# Patient Record
Sex: FEMALE | Race: BLACK OR AFRICAN AMERICAN | NOT HISPANIC OR LATINO | Employment: UNEMPLOYED | URBAN - NONMETROPOLITAN AREA
[De-identification: names, ages, dates, MRNs, and addresses within clinical notes are randomized per-mention and may not be internally consistent; named-entity substitution may affect disease eponyms.]

---

## 2021-01-27 ENCOUNTER — CONVERSION ENCOUNTER (OUTPATIENT)
Dept: FAMILY MEDICINE CLINIC | Facility: CLINIC | Age: 26
End: 2021-01-27

## 2021-01-27 ENCOUNTER — OFFICE VISIT CONVERTED (OUTPATIENT)
Dept: FAMILY MEDICINE CLINIC | Facility: CLINIC | Age: 26
End: 2021-01-27
Attending: NURSE PRACTITIONER

## 2021-05-10 NOTE — H&P
"   History and Physical      Patient Name: Timothy Gayle   Patient ID: [id number]   Sex: Female   YOB: 1995    Primary Care Provider: Alia CHEN   Referring Provider: Alia CHEN    Visit Date: January 27, 2021    Provider: APRIL Ibarra   Location: [facility]   Location Address: [address] [place] [address]  [id number]   Location Phone: [phone]          Chief Complaint  · Establish care, needs a physical.      History Of Present Illness  Timothy Gayle is a 25 year old /Black female who presents for evaluation and treatment of:      New patient/establish care:    Lives in [place], previously from [place], no previous provider. No current meds.    Current CC patient residing at [facility]. States she is staying in treatment to avoid [facility] time, did not elaborate further.     Pt with no acute issues or complaints.            Allergy List  Allergen Name Date Reaction Notes   NO KNOWN DRUG ALLERGIES --  --  --          Social History  Finding Status Start/Stop Quantity Notes   Tobacco Never --/-- --  Non user of tobacco products.         Review of Systems  · Constitutional  o Denies  o : fever, fatigue, weight loss, weight gain  · HENT  o Denies  o : headaches, nasal congestion, sore throat  · Cardiovascular  o Denies  o : lower extremity edema, claudication, chest pressure, palpitations  · Respiratory  o Denies  o : shortness of breath, wheezing, cough, hemoptysis, dyspnea on exertion  · Gastrointestinal  o Denies  o : nausea, vomiting, diarrhea, constipation, abdominal pain  · Musculoskeletal  o Denies  o : muscle pain, back pain  · Psychiatric  o Denies  o : anxiety, depression, suicidal ideation, homicidal ideation      Vitals  Date Time BP Position Site L\R Cuff Size HR RR TEMP (F) WT  HT  BMI kg/m2 BSA m2 O2 Sat FR L/min FiO2 HC       01/27/2021 02:32 /57 Sitting    90 - R 20 98 108lbs 8oz 5'  7.5\" 16.74 1.53 100 % "  21%          Physical Examination  · Constitutional  o Appearance  o : no acute distress, well-nourished  · Head and Face  o Head  o :   § Inspection  § : atraumatic, normocephalic  o Face  o :   § Inspection  § : no facial lesions  · Eyes  o Eyes  o : extraocular movements intact, no scleral icterus, no conjunctival injection  · Ears, Nose, Mouth and Throat  o Ears  o :   § External Ears  § : normal  o Nose  o :   § Intranasal Exam  § : nares patent  o Oral Cavity  o :   § Oral Mucosa  § : moist mucous membranes  · Neck  o Thyroid  o : gland size normal, nontender, no nodules or masses present on palpation, symmetric  · Respiratory  o Respiratory Effort  o : breathing comfortably, symmetric chest rise  o Auscultation of Lungs  o : clear to asculatation bilaterally, no wheezes, rales, or rhonchii  · Cardiovascular  o Heart  o :   § Auscultation of Heart  § : regular rate and rhythm, no murmurs, rubs, or gallops  o Peripheral Vascular System  o :   § Extremities  § : no edema  · Lymphatic  o Neck  o : no lymphadenopathy present  o Supraclavicular Nodes  o : no supraclavicular nodes  · Skin and Subcutaneous Tissue  o General Inspection  o : no lesions present, no areas of discoloration, skin turgor normal  · Neurologic  o Mental Status Examination  o :   § Orientation  § : grossly oriented to person, place and time  o Gait and Station  o :   § Gait Screening  § : normal gait  · Psychiatric  o General  o : normal mood and affect              Assessment  · Screening for depression     V79.0/Z13.89  · Medication monitoring encounter     V58.83/Z51.81  · Establishing care with new doctor, encounter for     V65.8/Z76.89  · History of substance abuse     305.93/F19.11       New patient/establish care:    No acute issues or complaints  Filled out CC pwk, signed, and returned to patient  Regular exercise/activity  Plenty of clear fluids, healthy diet  Continue to abstain from ETOH and drugs       Plan  · Orders  o [facility]  Report ([facility]) - V58.83/Z51.81 - 01/27/2021  o ACO-18: Positive screen for clinical depression using a standardized tool and a follow-up plan documented () - V79.0/Z13.89 - 01/27/2021  o ACO-14: Influenza immunization was not administered for reasons documented [facility] () - - 01/27/2021  o ACO-39: Current medications updated and reviewed (, 1159F) - - 01/27/2021  · Medications  o Medications have been Reconciled  o Transition of Care or Provider Policy  · Instructions  o Depression Screen completed and scanned into the EMR under the designated folder within the patient's documents.  o Today's PHQ-9 result is _6__  o The provider screening met the required time of 15 minutes.  o Take all medications as prescribed/directed.  o Patient was educated/instructed on their diagnosis, treatment and medications prior to discharge from the clinic today.  o Patient counseled to reduce calorie intake.  o Patient was instructed to exercise regularly.  o Patient instructed to seek medical attention urgently for new or worsening symptoms.  o Call the office with any concerns or questions.  o Bring all medicines with their bottles to each office visit.  o Time Out:  o Risks, benefits, and alternatives were discussed with the patient. The patient is aware of risks associated with: all meds and conditions.   o Chronic conditions reviewed and taken into consideration for today's treatment plan.  o Discussed Covid-19 precautions including, but not limited to, social distancing, avoid touching your face, and hand washing.   · Disposition  o Call or Return if symptoms worsen or persist.  o Follow Up PRN.  o Follow Up in 6 months.  o Proceed to ED for all medical emergencies.            Electronically Signed by: APRIL Ibarra -Author on January 27, 2021 03:26:24 PM

## 2021-05-14 VITALS
WEIGHT: 108.5 LBS | SYSTOLIC BLOOD PRESSURE: 108 MMHG | TEMPERATURE: 98 F | HEART RATE: 90 BPM | DIASTOLIC BLOOD PRESSURE: 57 MMHG | OXYGEN SATURATION: 100 % | BODY MASS INDEX: 17.03 KG/M2 | HEIGHT: 67 IN | RESPIRATION RATE: 20 BRPM